# Patient Record
(demographics unavailable — no encounter records)

---

## 2024-10-07 NOTE — PLAN
[FreeTextEntry1] : Annual Will get flu shot  bp good [facility] and lipid good continue meds as is

## 2024-10-07 NOTE — HEALTH RISK ASSESSMENT
[Very Good] : ~his/her~  mood as very good [Yes] : Yes [Monthly or less (1 pt)] : Monthly or less (1 point) [Never (0 pts)] : Never (0 points) [No falls in past year] : Patient reported no falls in the past year [0] : 2) Feeling down, depressed, or hopeless: Not at all (0) [PHQ-2 Negative - No further assessment needed] : PHQ-2 Negative - No further assessment needed [[id number]] : 0 [Former] : Former [> 15 Years] : > 15 Years [NO] : No [Change in mental status noted] : No change in mental status noted [Language] : denies difficulty with language [Behavior] : denies difficulty with behavior [Learning/Retaining New Information] : denies difficulty learning/retaining new information [Handling Complex Tasks] : denies difficulty handling complex tasks [Reasoning] : denies difficulty with reasoning [Spatial Ability and Orientation] : denies difficulty with spatial ability and orientation [None] : None [With Family] : lives with family [Retired] : retired [College] : College [] :  [Feels Safe at Home] : Feels safe at home [Fully functional (bathing, dressing, toileting, transferring, walking, feeding)] : Fully functional (bathing, dressing, toileting, transferring, walking, feeding) [Fully functional (using the telephone, shopping, preparing meals, housekeeping, doing laundry, using] : Fully functional and needs no help or supervision to perform IADLs (using the telephone, shopping, preparing meals, housekeeping, doing laundry, using transportation, managing medications and managing finances) [Reports changes in hearing] : Reports no changes in hearing [Reports changes in vision] : Reports no changes in vision [Reports changes in dental health] : Reports no changes in dental health [Smoke Detector] : smoke detector [Carbon Monoxide Detector] : carbon monoxide detector

## 2024-10-07 NOTE — HISTORY OF PRESENT ILLNESS
[de-identified] : Annual Will get flu shot  s/p [facility] care of [facility] s/p TAVR feel well  94 year old on bp med sand statin

## 2025-05-12 NOTE — PROCEDURE
[FreeTextEntry3] : Cerumen impaction removed with curette from left ear canal. After removal of cerumen canal noted to be normal without edema, purulence or inflammation. Patient tolerated procedure well. [de-identified] : Reason for laryngoscopy: Oral exam unable to account for symptoms.   Flexible scope #2 used. Passed through nasal passage and nasopharynx/oropharynx/hypopharynx clear. Supraglottis normal. Glottis with fully mobile vocal cords without lesions or masses. Visualized subglottis clear. Postcricoid area without erythema or edema. No pooling of secretions.

## 2025-05-12 NOTE — CONSULT LETTER
[Dear  ___] : Dear  [unfilled], [Consult Letter:] : I had the pleasure of evaluating your patient, [unfilled]. [Please see my note below.] : Please see my note below. [Consult Closing:] : Thank you very much for allowing me to participate in the care of this patient.  If you have any questions, please do not hesitate to contact me. [Sincerely,] : Sincerely, [FreeTextEntry3] : Jeannine Joseph MD Otolaryngology and Cranial Base Surgery  Attending Physician- Department of Otolaryngology and Head & Neck Surgery  [facility] [facility] Office: [phone]  Fax: [phone]

## 2025-05-12 NOTE — PHYSICAL EXAM
[Normal] : mucosa is normal [Midline] : trachea located in midline position [Laryngoscopy Performed] : laryngoscopy was performed, see procedure section for findings [de-identified] : Right: wnl, left: wax

## 2025-05-12 NOTE — ASSESSMENT
[FreeTextEntry1] : 95y/o male w/ SNHL wears HAs (has been using then for 4 years) who came in for ear wax and has noticed his voice is hoarse that has been going on for a few months   Ear exam: Right: wnl, left wax -Wax removed from the left ear canal  Hoarseness that lasts for an hour or so intermittently: Laryngoscopy shows a little mucus noted, otherwise no significant findings, no lesions, masses or nodules noted -Recommend salt water gargles when occurs and ensure adequate hydration  -F/u as needed

## 2025-05-12 NOTE — END OF VISIT
[FreeTextEntry3] : I personally saw and examined Mr. TRISHA HIGGINS in detail this visit today. I personally reviewed the HPI, PMH, FH, SH, ROS and medications/allergies. I have spoken to KEELY So regarding the history and have personally determined the assessment and plan of care, and documented this myself. I was present and participated in all key portions of the encounter and all procedures noted above. I have made changes in the body of the note where appropriate.   Attesting Faculty: See Attending Signature Below

## 2025-05-12 NOTE — HISTORY OF PRESENT ILLNESS
[de-identified] : 95y/o male w/ SNHL wears HAs (has been using then for 4 years) who came in for ear wax and has noticed his voice is hoarse that has been going on for a few months. He states the hoarseness will last an hour then will go away. He was a smoker but quit in 1955. He has no hx of reflux.  Pt denies any ear pain, drainage, tinnitus or worsening hearing loss.

## 2025-05-21 NOTE — PROCEDURE
[FreeTextEntry3] : Foreign body removed from left ear canal with forceps.  After removal of cerumen canal noted to be normal without edema, purulence or inflammation. Patient tolerated procedure well.

## 2025-05-21 NOTE — HISTORY OF PRESENT ILLNESS
[de-identified] : 95y/o male w/ SNHL wears HAs (has been using then for 4 years) who came in for ear wax and has noticed his voice is hoarse that has been going on for a few months. He states the hoarseness will last an hour then will go away. He was a smoker but quit in 1955. He has no hx of reflux.  Pt denies any ear pain, drainage, tinnitus or worsening hearing loss.  [FreeTextEntry1] : He is here for f/u for a possible piece of his hearing aid that fell off in his left ear canal. He has no current symptoms but he noticed the rubber piece was not attached after removing it from his ear.

## 2025-05-21 NOTE — ASSESSMENT
[FreeTextEntry1] : 93y/o male w/ SNHL wears HAs (has been using then for 4 years) who came in for ear wax and has noticed his voice is hoarse that has been going on for a few months   Ear exam: Right: wnl, left FB in ear canal -FB removed from the left ear canal  Hoarseness that lasts for an hour or so intermittently: stable -Recommend salt water gargles when occurs and ensure adequate hydration  -F/u as needed